# Patient Record
Sex: MALE | Race: WHITE
[De-identification: names, ages, dates, MRNs, and addresses within clinical notes are randomized per-mention and may not be internally consistent; named-entity substitution may affect disease eponyms.]

---

## 2017-09-26 ENCOUNTER — HOSPITAL ENCOUNTER (EMERGENCY)
Dept: HOSPITAL 10 - FTE | Age: 22
Discharge: HOME | End: 2017-09-26
Payer: COMMERCIAL

## 2017-09-26 VITALS
BODY MASS INDEX: 36.58 KG/M2 | HEIGHT: 72 IN | WEIGHT: 270.07 LBS | HEIGHT: 72 IN | BODY MASS INDEX: 36.58 KG/M2 | WEIGHT: 270.07 LBS

## 2017-09-26 DIAGNOSIS — A08.4: Primary | ICD-10-CM

## 2017-09-26 PROCEDURE — 99283 EMERGENCY DEPT VISIT LOW MDM: CPT

## 2017-09-26 NOTE — ERD
ER Documentation


Chief Complaint


Date/Time


DATE: 9/26/17 


TIME: 20:42


Chief Complaint


Vomiting since 0930, unable to eat or drink. Temp 103.8F (tymp)


 


 (CUISIA,KADEN SALINAS T. NP)





HPI


22-year-old male presents to emergency department for complaints of vomiting 

and diarrhea that started today.  Patient started to have fever.  Patient had 4 

episodes of vomiting, 4 episodes of diarrhea.  Patient does not have any blood 

in the stool or black stool.  Patient did not have any blood in the vomit.  

Patient has been having fever, patient did not take any medications to help 

with symptoms.  Patient denies any abdominal pain.  Patient denies any flank 

pain.  Patient denies eating something or different.  Patient denies any 

hematuria or dysuria.


 (KADEN REYES NP)





ROS


All systems reviewed and are negative except as per history of present illness.


 (KADEN REYES NP)





Medications


Home Meds


Active Scripts


Acetaminophen* (Tylophen*) 500 Mg Capsule, 1 CAP PO Q6H Y for PAIN AND OR 

ELEVATED TEMP, #20 CAP


   Prov:KADEN REYES NP         9/26/17


Ibuprofen* (Motrin*) 600 Mg Tab, 600 MG PO Q6H Y for PAIN AND OR ELEVATED TEMP, 

#30 TAB


   Prov:KADEN REYES NP         9/26/17


Cetirizine Hcl* (Zyrtec*) 10 Mg Capsule, 10 MG PO DAILY, #30 TAB.CHEW


   Prov:KADEN REYES NP         9/26/17


Dicyclomine Hcl* (Bentyl*) 10 Mg Capsule, 10 MG PO QID, #20 CAP


   Prov:KADEN REYES NP         9/26/17


Reported Medications


[none] Unknown Strength  No Conflict Check


   9/26/17





Allergies


Allergies:  


Coded Allergies:  


     No Known Allergy (Unverified , 9/26/17)





PMhx/Soc


Medical and Surgical Hx:  pt denies Medical Hx, pt denies Surgical Hx


History of Surgery:  No


Hx Alcohol Use:  No


Hx Substance Use:  No


Hx Tobacco Use:  No


Smoking Status:  Never smoker


 (KADEN REYES NP)





Physical Exam


Physical Exam


GENERAL:  The patient is well developed and appropriate for usual state of 

health, in no apparent distress.


CHEST:  Clear to auscultation bilaterally. There are no rales, wheezes or 

rhonchi. 


HEART:  Regular rate and rhythm. No murmurs, clicks, rubs or gallops. No S3 or 

S4.


ABDOMEN:  Soft, nontender and nondistended.  Hyperactive bowel sounds. No 

rebound or guarding. No gross peritonitis. No gross organomegaly or masses. No 

Lauren sign or McBurney point tenderness.


BACK:  No midline or flank tenderness.


EXTREMITIES:  Equal pulses bilaterally. There is no peripheral clubbing, 

cyanosis or edema. No focal swelling or erythema. Full range of motion. Grossly 

neurovascularly intact.


NEURO:  Alert and oriented. Cranial nerves 2-12 intact. Motor strength in all 4 

extremities with 5/5 strength.  Sensation grossly intact. Normal speech and 

gait. 


SKIN:  There is no apparent rash or petechia. The skin is warm and dry.


HEMATOLOGIC AND LYMPHATIC:  There is no evidence of excessive bruising or 

lymphedema. No gross cervical, axillary, or inguinal lymphadenopathy.


 (KADEN REYES NP)


Results 24 hrs





 Current Medications








 Medications


  (Trade)  Dose


 Ordered  Sig/Kenisha


 Route


 PRN Reason  Start Time


 Stop Time Status Last Admin


Dose Admin


 


 Ondansetron HCl


  (Zofran Odt)  4 mg  ONCE  STAT


 ODT


   9/26/17 20:26


 9/26/17 20:28 DC 9/26/17 20:59


 


 


 Dicyclomine HCl


  (Bentyl)  20 mg  ONCE  ONCE


 PO


   9/26/17 20:30


 9/26/17 20:31 DC 9/26/17 21:01


 


 


 Ibuprofen


  (Motrin)  600 mg  ONCE  ONCE


 PO


   9/26/17 20:30


 9/26/17 20:31 DC 9/26/17 21:00


 


 


 Acetaminophen


  (Tylenol Tab)  650 mg  ONCE  ONCE


 PO


   9/26/17 20:30


 9/26/17 20:31 DC 9/26/17 21:01


 





 (FELIPE FARRELL MD)


Results 24 hrs


Patient was given medicines for fever control here in the emergency department. 

After treatment, patient temperature improved and lower. Patient appears well 

and is hemodynamically stable. 


Bentyl and Zofran was given here in emergency department, verbalized feeling 

much better afterwards


Temperature rechecked 100, verbalized better. Heart Rate 98 bpm





 (KADEN REYES NP)





Procedures/MDM


Medical Decision Making: Patient symptoms of vomiting and diarrhea most likely 

is consistent with viral gastroenteritis.  Patient doesn't have abdominal pain. 

There is low suspicion for abdominal emergencies at this time. Patients 

abdominal exam is normal at this time. Patients radiology exam dnot indicated 

at this time. . There is low suspicion for appendicitis, cholecystitis, 

abdominal aortic aneurysms or peritonitis at this time.  There is low suspicion 

for sepsis. Patient appears well and is hemodynamically stable.





Disposition: Home. Condition: Stable


Prescription Bentyl, Zofran, ibuprofen and Tylenol


Instructions: Patient is advised to take medications as prescribed. Patient is 

advised to rest, increase fluid intake and do brat diet for next 1-2 days and 

progress as tolerated. Patient is advised that if symptoms are worse, severe 

abdominal pain, uncontrolled vomiting, high fever, severe flank pain, worst 

signs and symptoms, to return to the emergency department immediately.  

Otherwise, patient can follow up with primary care doctor in 5-7 days. 








Disclaimer: Inadvertent spelling and grammatical errors are likely due to EHR/

dictation software use and do not reflect on the overall quality of patient 

care. Also, please note that the electronic time recorded on this note does not 

necessarily reflect the actual time of the patient encounter.


 (KADEN REYES NP)


Physician addendum: Patient was seen by mid-level exclusively.  Case was not 

discussed with me and I was unaware the patient while he was in the emergency 

department.  The chart was not available for review by me until several days 

later.


 (FELIPE FARRELL MD)





Departure


Diagnosis:  


 Primary Impression:  


 Viral gastroenteritis


Condition:  Stable


Patient Instructions:  Gastroenteritis, Viral (6Y-Adult)





Additional Instructions:  


Patient is advised to take medications as prescribed. Patient is advised to rest

, increase fluid intake and do brat diet for next 1-2 days and progress as 

tolerated. Patient is advised that if symptoms are worse, severe abdominal pain

, uncontrolled vomiting, high fever, severe flank pain, worst signs and symptoms

, to return to the emergency department immediately.  Otherwise, patient can 

follow up with primary care doctor in 5-7 days.











KADEN REYES NP Sep 26, 2017 20:49


FELIPE FARRELL MD Oct 5, 2017 22:05

## 2018-07-31 ENCOUNTER — HOSPITAL ENCOUNTER (EMERGENCY)
Dept: HOSPITAL 91 - E/R | Age: 23
Discharge: HOME | End: 2018-07-31
Payer: MEDICAID

## 2018-07-31 ENCOUNTER — HOSPITAL ENCOUNTER (EMERGENCY)
Age: 23
Discharge: HOME | End: 2018-07-31

## 2018-07-31 DIAGNOSIS — N45.1: Primary | ICD-10-CM

## 2018-07-31 LAB
ADD UMIC: YES
UR ASCORBIC ACID: NEGATIVE MG/DL
UR BILIRUBIN (DIP): NEGATIVE MG/DL
UR BLOOD (DIP): NEGATIVE MG/DL
UR CALCIUM OXALATE CRYSTAL: (no result) /HPF
UR CLARITY: CLEAR
UR COLOR: YELLOW
UR GLUCOSE (DIP): NEGATIVE MG/DL
UR KETONES (DIP): NEGATIVE MG/DL
UR LEUKOCYTE ESTERASE (DIP): NEGATIVE LEU/UL
UR MUCUS: (no result) /HPF
UR NITRITE (DIP): NEGATIVE MG/DL
UR PH (DIP): 5 (ref 5–9)
UR RBC: 1 /HPF (ref 0–5)
UR SPECIFIC GRAVITY (DIP): 1.03 (ref 1–1.03)
UR TOTAL PROTEIN (DIP): (no result) MG/DL
UR UROBILINOGEN (DIP): (no result) MG/DL
UR WBC: 1 /HPF (ref 0–5)
URINE PH (DIP) POC: 5.5 (ref 5–8.5)
URINE TOTAL PROTEIN POC: (no result)

## 2018-07-31 PROCEDURE — 96372 THER/PROPH/DIAG INJ SC/IM: CPT

## 2018-07-31 PROCEDURE — 99285 EMERGENCY DEPT VISIT HI MDM: CPT

## 2018-07-31 PROCEDURE — 81003 URINALYSIS AUTO W/O SCOPE: CPT

## 2018-07-31 PROCEDURE — 76870 US EXAM SCROTUM: CPT

## 2018-07-31 PROCEDURE — 81001 URINALYSIS AUTO W/SCOPE: CPT

## 2018-07-31 RX ADMIN — CEFTRIAXONE SODIUM 1 MG: 500 INJECTION, POWDER, FOR SOLUTION INTRAMUSCULAR; INTRAVENOUS at 03:34
